# Patient Record
(demographics unavailable — no encounter records)

---

## 2025-07-03 NOTE — HISTORY OF PRESENT ILLNESS
[FreeTextEntry1] : annual well check and to establish care w/ a new PMD [de-identified] : -PMH: h/o GDM  -SH: . 2 children. Non-smoker. Occasional EtOH use.   MATT is a 40 year F whom is here today for an annual well check and to establish care w/ a new PMD Today, pt reports feeling well  However she does have a few complaints. Reports on the left lower side of her rib cage she has a lump that has been there for the past 5 years and is unchanged in size.  She does report that it is sometimes tender to palpate.  Otherwise she is not bothered by it.  She states she had imaging 5 years ago when she first noticed it at [facility] radiology and everything was read as normal. episodic b/l breast pain ongoing for the past 2 years.  Last experienced 3 weeks ago.  When pain occurs it is described as sharp in nature.  States that it is short-lived and resolves on its own.  Rates pain when it occurs a 9 out of 10 hair loss generalized thinning  Specialists Involved: -OBGYN: Dr. Matos  -Vaccines: Needs Flu -Mammogram: script given today -Pap Smear: 9/2022 -FH of Colon, breast or ovarian CA: Maternal Cousin w/ Breast CA

## 2025-07-03 NOTE — HEALTH RISK ASSESSMENT
[Excellent] : ~his/her~  mood as  excellent [No] : In the past 12 months have you used drugs other than those required for medical reasons? No [0] : 2) Feeling down, depressed, or hopeless: Not at all (0) [PHQ-2 Negative - No further assessment needed] : PHQ-2 Negative - No further assessment needed [Audit-CScore] : 0 [[id number]] : 0 [Patient reported PAP Smear was normal] : Patient reported PAP Smear was normal [HIV test declined] : HIV test declined [Hepatitis C test declined] : Hepatitis C test declined [Change in mental status noted] : No change in mental status noted [With Family] : lives with family [Employed] : employed [] :  [# Of Children ___] : has [unfilled] children [PapSmearDate] : 09/22 [Reviewed no changes] : Reviewed, no changes [AdvancecareDate] : 07/25 [Never] : Never

## 2025-07-03 NOTE — ASSESSMENT
[Vaccines Reviewed] : Immunizations reviewed today. Please see immunization details in the vaccine log within the immunization flowsheet.  [FreeTextEntry1] : Normal vital signs aside for elevated BMI for which dietary/lifestyle modifications with goal of weight loss is encouraged.  Mediterranean type diet preferred EKG obtained in office today demonstrates normal sinus rhythm with no acute ST/T wave changes Regards to patient's episodic breast pain I recommend we have her undergo routine breast imaging with bilateral mammogram as well as a breast ultrasound to further evaluate.  Should these results come back normal we will likely recommend breast surgeon plus or minus MRI cervical and thoracic spine Will request imaging from [facility] regarding evaluation of left lower rib cage lump/discomfort which at this time is of unclear etiology.  Reassuringly it has not had any change in size Follow-up with GYN for routine cervical cancer screening Follow-up labs drawn in office today with further recommendations to come pending results Obtain the flu vaccination come the fall RTO 1 year for annual physical sooner as needed pending the above

## 2025-07-17 NOTE — HISTORY OF PRESENT ILLNESS
[FreeTextEntry1] : T2DM, Obesity, HLD, elevated ALP, Iron def [de-identified] : -PMH: T2DM, HLD -SH: . 2 children. Non-smoker. Occasional EtOH use.   MATT is a 40 year F whom is here today for T2DM, Obesity, HLD, elevated ALP, Iron def Today, pt reports feeling well   -T2DM: Now on Metformin 500mg BID. Initially states she had diarrhea but resolved. Has freestyle Meter insitu. Fasting BG level at time of visit today 165.  -HLD: Now on Lipitor 10mg qd

## 2025-07-24 NOTE — HISTORY OF PRESENT ILLNESS
[FreeTextEntry1] : Patient referred for evaluation of BI-RADS 4 (suspicious) ultrasound finding and mammographic finding in the upper outer right breast  Tissue sampling is suggested by the radiologist  The patient denies palpable breast mass, skin thickening, nipple discharge  She has no first-degree relatives with breast cancer

## 2025-07-24 NOTE — PROCEDURE
[FreeTextEntry3] : Ultrasound-guided core biopsy right breast  The patient has a 1 cm hypoechoic nodule in the upper outer right breast  The mass is located at 11:00 axis (obtained, 1% lidocaine was infiltrated to the skin and multiple core biopsies were performed  A postbiopsy clip was placed  The patient was advised postprocedure mammogram  BI-RADS 4

## 2025-07-24 NOTE — ASSESSMENT
[FreeTextEntry1] : Indeterminate mass upper outer right breast  Ultrasound-guided core biopsy was performed  Postprocedure mammogram breast  If the lesion does not correlate with the mammographic finding the patient may require a stereotactic right breast biopsy  45 minutes was spent in consultation evaluation review